# Patient Record
Sex: FEMALE | Race: WHITE | URBAN - METROPOLITAN AREA
[De-identification: names, ages, dates, MRNs, and addresses within clinical notes are randomized per-mention and may not be internally consistent; named-entity substitution may affect disease eponyms.]

---

## 2017-02-15 ENCOUNTER — OFFICE VISIT (OUTPATIENT)
Dept: OPHTHALMOLOGY | Facility: CLINIC | Age: 67
End: 2017-02-15

## 2017-02-15 DIAGNOSIS — H25.13 AGE-RELATED NUCLEAR CATARACT OF BOTH EYES: Primary | ICD-10-CM

## 2017-02-15 DIAGNOSIS — H43.392 FLOATER, VITREOUS, LEFT: ICD-10-CM

## 2017-02-15 PROCEDURE — 92014 COMPRE OPH EXAM EST PT 1/>: CPT | Performed by: OPHTHALMOLOGY

## 2017-02-15 NOTE — PATIENT INSTRUCTIONS
Age-related nuclear cataract of both eyes  Discussed early cataracts with patient. No treatment recommended at this time. Floater, vitreous  No treatment.

## 2017-02-15 NOTE — PROGRESS NOTES
Usha Maldonado is a 78year old female. HPI:     HPI     Patient is here for a complete eye exam. Patient states no ocular complaints or changes in her vision.        Last edited by Brennen Dimas O.T. on 2/15/2017  8:57 AM.     Patient History:  Past Me Cornea crocodile shagreen crocodile shagreen    Anterior Chamber Deep and quiet Deep and quiet    Iris Normal Normal    Lens 1+ Nuclear sclerosis, Trace Cortical cataract 1+ Nuclear sclerosis, Trace Cortical cataract    Vitreous Clear Vitreous floaters

## 2018-04-11 ENCOUNTER — OFFICE VISIT (OUTPATIENT)
Dept: OPHTHALMOLOGY | Facility: CLINIC | Age: 68
End: 2018-04-11

## 2018-04-11 DIAGNOSIS — H43.393 VITREOUS FLOATERS OF BOTH EYES: ICD-10-CM

## 2018-04-11 DIAGNOSIS — H25.13 AGE-RELATED NUCLEAR CATARACT OF BOTH EYES: Primary | ICD-10-CM

## 2018-04-11 PROCEDURE — 92014 COMPRE OPH EXAM EST PT 1/>: CPT | Performed by: OPHTHALMOLOGY

## 2018-04-11 RX ORDER — CHOLECALCIFEROL (VITAMIN D3) 125 MCG
2000 CAPSULE ORAL
COMMUNITY

## 2018-04-11 RX ORDER — MULTIVITAMIN
1 TABLET ORAL
COMMUNITY

## 2018-04-11 RX ORDER — ATORVASTATIN CALCIUM 10 MG/1
10 TABLET, FILM COATED ORAL
COMMUNITY
Start: 2017-10-27

## 2018-04-11 NOTE — PROGRESS NOTES
Julianna Pozo is a 75year old female. HPI:     HPI     Patient is here for a complete eye exam. Patient states no ocular complaints or changes in her vision.     Last edited by Ofe Gonsalez OT on 4/11/2018  2:22 PM. (History)        Patient History: Dilation     Both eyes:  paremyd @ 2:30 PM            Slit Lamp and Fundus Exam     Slit Lamp Exam       Right Left    Lids/Lashes Dermatochalasis, Meibomian gland dysfunction Dermatochalasis, Meibomian gland dysfunction    Conjunctiva/Sclera Temp pi

## 2019-07-11 ENCOUNTER — OFFICE VISIT (OUTPATIENT)
Dept: OPHTHALMOLOGY | Facility: CLINIC | Age: 69
End: 2019-07-11
Payer: COMMERCIAL

## 2019-07-11 DIAGNOSIS — H25.13 AGE-RELATED NUCLEAR CATARACT OF BOTH EYES: Primary | ICD-10-CM

## 2019-07-11 DIAGNOSIS — H43.393 VITREOUS FLOATERS OF BOTH EYES: ICD-10-CM

## 2019-07-11 PROCEDURE — 92014 COMPRE OPH EXAM EST PT 1/>: CPT | Performed by: OPHTHALMOLOGY

## 2019-07-11 NOTE — PROGRESS NOTES
Belen Verma is a 70year old female. HPI:     HPI     Pt is here for a complete exam. Pt is happy with current glasses, vision is stable. Pt has no ocular complaints at this time.      Last edited by Niharika Latif OT on 7/11/2019  2:56 PM. (History) Ortho Full, Ortho          Dilation     Both eyes:  Paremyd X2 @ 3:07 PM            Slit Lamp and Fundus Exam     Slit Lamp Exam       Right Left    Lids/Lashes Dermatochalasis, Meibomian gland dysfunction Dermatochalasis, Meibomian gland dysfunction    Co

## 2021-05-05 ENCOUNTER — OFFICE VISIT (OUTPATIENT)
Dept: OPHTHALMOLOGY | Facility: CLINIC | Age: 71
End: 2021-05-05
Payer: COMMERCIAL

## 2021-05-05 DIAGNOSIS — H43.393 VITREOUS FLOATERS OF BOTH EYES: ICD-10-CM

## 2021-05-05 DIAGNOSIS — H25.13 AGE-RELATED NUCLEAR CATARACT OF BOTH EYES: Primary | ICD-10-CM

## 2021-05-05 PROCEDURE — 92014 COMPRE OPH EXAM EST PT 1/>: CPT | Performed by: OPHTHALMOLOGY

## 2021-05-05 PROCEDURE — 92015 DETERMINE REFRACTIVE STATE: CPT | Performed by: OPHTHALMOLOGY

## 2021-05-05 NOTE — PATIENT INSTRUCTIONS
Age-related nuclear cataract of both eyes  Discussed moderate cataracts in both eyes that are not affecting vision and are not surgical at this time. New glasses today; update as needed. Discussed that new prescription is only a slight change.        Vi

## 2021-05-05 NOTE — PROGRESS NOTES
Lidia Grace is a 69year old female. HPI:     HPI     Pt in today for a complete eye exam. Pt states vision is stable with current glasses but wants an updated glasses Rx. Pt denies any ocular issues.      Last edited by Farhad Carranza OT on 5/5/2021 Dilation     Both eyes: 1.0% Mydriacyl and 2.5% Agustin Synephrine @ 9:50 AM            Slit Lamp and Fundus Exam     Slit Lamp Exam       Right Left    Lids/Lashes Dermatochalasis, Meibomian gland dysfunction, 2+ Scurf Dermatochalasis, Meibomian gland dysfunc

## 2022-05-09 ENCOUNTER — HOSPITAL ENCOUNTER (OUTPATIENT)
Age: 72
Discharge: HOME | End: 2022-05-09
Payer: MEDICARE

## 2022-05-09 VITALS
SYSTOLIC BLOOD PRESSURE: 130 MMHG | HEART RATE: 72 BPM | BODY MASS INDEX: 18.83 KG/M2 | DIASTOLIC BLOOD PRESSURE: 60 MMHG | TEMPERATURE: 98 F | OXYGEN SATURATION: 98 % | HEIGHT: 67 IN | RESPIRATION RATE: 16 BRPM | WEIGHT: 120 LBS

## 2022-05-09 DIAGNOSIS — U07.1 COVID-19: Primary | ICD-10-CM

## 2022-05-09 LAB — SARS-COV-2 RNA RESP QL NAA+PROBE: DETECTED

## 2022-05-09 PROCEDURE — U0002 COVID-19 LAB TEST NON-CDC: HCPCS | Performed by: NURSE PRACTITIONER

## 2022-05-09 PROCEDURE — 99204 OFFICE O/P NEW MOD 45 MIN: CPT | Performed by: NURSE PRACTITIONER

## 2022-05-09 PROCEDURE — M0222 INTRAVENOUS INJECTION, BEBTELOVIMAB, INCLUDES INJECTION AND POST ADMINISTRATIVE MONITORING: HCPCS | Performed by: NURSE PRACTITIONER

## 2022-05-09 RX ORDER — BEBTELOVIMAB 87.5 MG/ML
175 INJECTION, SOLUTION INTRAVENOUS ONCE
Status: COMPLETED | OUTPATIENT
Start: 2022-05-09 | End: 2022-05-09

## 2022-10-19 ENCOUNTER — OFFICE VISIT (OUTPATIENT)
Dept: OPHTHALMOLOGY | Facility: CLINIC | Age: 72
End: 2022-10-19
Payer: COMMERCIAL

## 2022-10-19 DIAGNOSIS — H43.393 VITREOUS FLOATERS OF BOTH EYES: ICD-10-CM

## 2022-10-19 DIAGNOSIS — H25.13 AGE-RELATED NUCLEAR CATARACT OF BOTH EYES: Primary | ICD-10-CM

## 2022-10-19 PROCEDURE — 1126F AMNT PAIN NOTED NONE PRSNT: CPT | Performed by: OPHTHALMOLOGY

## 2022-10-19 PROCEDURE — 92015 DETERMINE REFRACTIVE STATE: CPT | Performed by: OPHTHALMOLOGY

## 2022-10-19 PROCEDURE — 92014 COMPRE OPH EXAM EST PT 1/>: CPT | Performed by: OPHTHALMOLOGY

## 2022-10-19 NOTE — PATIENT INSTRUCTIONS
Age-related nuclear cataract of both eyes  Discussed moderate cataracts in both eyes that are not affecting vision and are not surgical at this time. New glasses today; update as needed. Discussed that new prescription is only a slight change. Will see patient in 1 year for a complete exam    Vitreous floaters of both eyes   There is no evidence of retinal pathology. All signs and symptoms of retinal detachment/tears explained in detail. Patient instructed to call the office if they experience increase in floaters, increase in flashes of light, loss of vision or curtain or veil effect.

## 2022-10-19 NOTE — ASSESSMENT & PLAN NOTE
Discussed moderate cataracts in both eyes that are not affecting vision and are not surgical at this time. New glasses today; update as needed. Discussed that new prescription is only a slight change.      Will see patient in 1 year for a complete exam

## 2023-07-03 ENCOUNTER — APPOINTMENT (OUTPATIENT)
Dept: GENERAL RADIOLOGY | Facility: HOSPITAL | Age: 73
End: 2023-07-03
Attending: EMERGENCY MEDICINE
Payer: MEDICARE

## 2023-07-03 ENCOUNTER — HOSPITAL ENCOUNTER (EMERGENCY)
Facility: HOSPITAL | Age: 73
Discharge: HOME | End: 2023-07-03
Attending: EMERGENCY MEDICINE
Payer: MEDICARE

## 2023-07-03 ENCOUNTER — APPOINTMENT (OUTPATIENT)
Dept: CT IMAGING | Facility: HOSPITAL | Age: 73
End: 2023-07-03
Attending: EMERGENCY MEDICINE
Payer: MEDICARE

## 2023-07-03 ENCOUNTER — HOSPITAL ENCOUNTER (OUTPATIENT)
Age: 73
Discharge: ANOTHER HOSPITAL | End: 2023-07-03
Payer: MEDICARE

## 2023-07-03 VITALS
DIASTOLIC BLOOD PRESSURE: 80 MMHG | SYSTOLIC BLOOD PRESSURE: 155 MMHG | WEIGHT: 130 LBS | HEART RATE: 72 BPM | OXYGEN SATURATION: 95 % | RESPIRATION RATE: 15 BRPM | HEIGHT: 63 IN | BODY MASS INDEX: 23.04 KG/M2 | TEMPERATURE: 98 F

## 2023-07-03 VITALS
TEMPERATURE: 97 F | HEART RATE: 95 BPM | RESPIRATION RATE: 18 BRPM | OXYGEN SATURATION: 97 % | SYSTOLIC BLOOD PRESSURE: 173 MMHG | DIASTOLIC BLOOD PRESSURE: 83 MMHG

## 2023-07-03 DIAGNOSIS — R42 DIZZINESS: Primary | ICD-10-CM

## 2023-07-03 LAB
ALBUMIN SERPL-MCNC: 3.9 G/DL (ref 3.4–5)
ALP LIVER SERPL-CCNC: 50 U/L
ALT SERPL-CCNC: 25 U/L
ANION GAP SERPL CALC-SCNC: 6 MMOL/L (ref 0–18)
AST SERPL-CCNC: 23 U/L (ref 15–37)
BASOPHILS # BLD AUTO: 0.03 X10(3) UL (ref 0–0.2)
BASOPHILS NFR BLD AUTO: 0.6 %
BILIRUB DIRECT SERPL-MCNC: <0.1 MG/DL (ref 0–0.2)
BILIRUB SERPL-MCNC: 0.4 MG/DL (ref 0.1–2)
BILIRUB UR QL: NEGATIVE
BUN BLD-MCNC: 21 MG/DL (ref 7–18)
BUN/CREAT SERPL: 26.6 (ref 10–20)
CALCIUM BLD-MCNC: 9.3 MG/DL (ref 8.5–10.1)
CHLORIDE SERPL-SCNC: 108 MMOL/L (ref 98–112)
CLARITY UR: CLEAR
CO2 SERPL-SCNC: 27 MMOL/L (ref 21–32)
COLOR UR: COLORLESS
CREAT BLD-MCNC: 0.79 MG/DL
DEPRECATED RDW RBC AUTO: 40.6 FL (ref 35.1–46.3)
EOSINOPHIL # BLD AUTO: 0.06 X10(3) UL (ref 0–0.7)
EOSINOPHIL NFR BLD AUTO: 1.1 %
ERYTHROCYTE [DISTWIDTH] IN BLOOD BY AUTOMATED COUNT: 12.1 % (ref 11–15)
GFR SERPLBLD BASED ON 1.73 SQ M-ARVRAT: 79 ML/MIN/1.73M2 (ref 60–?)
GLUCOSE BLD-MCNC: 106 MG/DL (ref 70–99)
GLUCOSE UR-MCNC: NORMAL MG/DL
HCT VFR BLD AUTO: 38.4 %
HGB BLD-MCNC: 13.1 G/DL
IMM GRANULOCYTES # BLD AUTO: 0 X10(3) UL (ref 0–1)
IMM GRANULOCYTES NFR BLD: 0 %
KETONES UR-MCNC: NEGATIVE MG/DL
LEUKOCYTE ESTERASE UR QL STRIP.AUTO: NEGATIVE
LIPASE SERPL-CCNC: 41 U/L (ref 13–75)
LYMPHOCYTES # BLD AUTO: 1.34 X10(3) UL (ref 1–4)
LYMPHOCYTES NFR BLD AUTO: 25.5 %
MCH RBC QN AUTO: 31.5 PG (ref 26–34)
MCHC RBC AUTO-ENTMCNC: 34.1 G/DL (ref 31–37)
MCV RBC AUTO: 92.3 FL
MONOCYTES # BLD AUTO: 0.34 X10(3) UL (ref 0.1–1)
MONOCYTES NFR BLD AUTO: 6.5 %
NEUTROPHILS # BLD AUTO: 3.49 X10 (3) UL (ref 1.5–7.7)
NEUTROPHILS # BLD AUTO: 3.49 X10(3) UL (ref 1.5–7.7)
NEUTROPHILS NFR BLD AUTO: 66.3 %
NITRITE UR QL STRIP.AUTO: NEGATIVE
OSMOLALITY SERPL CALC.SUM OF ELEC: 295 MOSM/KG (ref 275–295)
PH UR: 6.5 [PH] (ref 5–8)
PLATELET # BLD AUTO: 239 10(3)UL (ref 150–450)
POTASSIUM SERPL-SCNC: 4 MMOL/L (ref 3.5–5.1)
PROT SERPL-MCNC: 7.5 G/DL (ref 6.4–8.2)
PROT UR-MCNC: NEGATIVE MG/DL
RBC # BLD AUTO: 4.16 X10(6)UL
SODIUM SERPL-SCNC: 141 MMOL/L (ref 136–145)
SP GR UR STRIP: <1.005 (ref 1–1.03)
TROPONIN I HIGH SENSITIVITY: 11 NG/L
TROPONIN I HIGH SENSITIVITY: 9 NG/L
UROBILINOGEN UR STRIP-ACNC: NORMAL
WBC # BLD AUTO: 5.3 X10(3) UL (ref 4–11)

## 2023-07-03 PROCEDURE — 81001 URINALYSIS AUTO W/SCOPE: CPT | Performed by: EMERGENCY MEDICINE

## 2023-07-03 PROCEDURE — 99285 EMERGENCY DEPT VISIT HI MDM: CPT

## 2023-07-03 PROCEDURE — 93005 ELECTROCARDIOGRAM TRACING: CPT

## 2023-07-03 PROCEDURE — 99214 OFFICE O/P EST MOD 30 MIN: CPT | Performed by: NURSE PRACTITIONER

## 2023-07-03 PROCEDURE — 84484 ASSAY OF TROPONIN QUANT: CPT | Performed by: EMERGENCY MEDICINE

## 2023-07-03 PROCEDURE — 70498 CT ANGIOGRAPHY NECK: CPT | Performed by: EMERGENCY MEDICINE

## 2023-07-03 PROCEDURE — 36415 COLL VENOUS BLD VENIPUNCTURE: CPT

## 2023-07-03 PROCEDURE — 85025 COMPLETE CBC W/AUTO DIFF WBC: CPT | Performed by: EMERGENCY MEDICINE

## 2023-07-03 PROCEDURE — 71045 X-RAY EXAM CHEST 1 VIEW: CPT | Performed by: EMERGENCY MEDICINE

## 2023-07-03 PROCEDURE — 83690 ASSAY OF LIPASE: CPT | Performed by: EMERGENCY MEDICINE

## 2023-07-03 PROCEDURE — 70496 CT ANGIOGRAPHY HEAD: CPT | Performed by: EMERGENCY MEDICINE

## 2023-07-03 PROCEDURE — 93010 ELECTROCARDIOGRAM REPORT: CPT

## 2023-07-03 PROCEDURE — 80048 BASIC METABOLIC PNL TOTAL CA: CPT | Performed by: EMERGENCY MEDICINE

## 2023-07-03 PROCEDURE — 80076 HEPATIC FUNCTION PANEL: CPT | Performed by: EMERGENCY MEDICINE

## 2023-07-03 RX ORDER — MECLIZINE HYDROCHLORIDE 25 MG/1
12.5 TABLET ORAL 3 TIMES DAILY PRN
Qty: 10 TABLET | Refills: 0 | Status: SHIPPED | OUTPATIENT
Start: 2023-07-03 | End: 2023-07-06

## 2023-07-03 RX ORDER — MECLIZINE HYDROCHLORIDE 25 MG/1
25 TABLET ORAL ONCE
Status: COMPLETED | OUTPATIENT
Start: 2023-07-03 | End: 2023-07-03

## 2023-07-03 NOTE — ED INITIAL ASSESSMENT (HPI)
Pt c/o dizziness x 1 day. States is not much different with position changes. Denies headache, chest pain.

## 2023-07-03 NOTE — ED INITIAL ASSESSMENT (HPI)
Pt reports dizziness x1 day. Pt reports dizziness is worse when bends over.  Pt denies vision changes/ cp/ sob/ numbness/tingling to arms/ legs

## 2023-07-05 LAB
ATRIAL RATE: 63 BPM
ATRIAL RATE: 90 BPM
P AXIS: 50 DEGREES
P AXIS: 76 DEGREES
P-R INTERVAL: 160 MS
P-R INTERVAL: 168 MS
Q-T INTERVAL: 430 MS
Q-T INTERVAL: 490 MS
QRS DURATION: 140 MS
QRS DURATION: 142 MS
QTC CALCULATION (BEZET): 501 MS
QTC CALCULATION (BEZET): 526 MS
R AXIS: -18 DEGREES
R AXIS: -28 DEGREES
T AXIS: 64 DEGREES
T AXIS: 91 DEGREES
VENTRICULAR RATE: 63 BPM
VENTRICULAR RATE: 90 BPM

## 2023-07-06 ENCOUNTER — OFFICE VISIT (OUTPATIENT)
Dept: FAMILY MEDICINE CLINIC | Facility: CLINIC | Age: 73
End: 2023-07-06

## 2023-07-06 VITALS
HEART RATE: 67 BPM | OXYGEN SATURATION: 95 % | DIASTOLIC BLOOD PRESSURE: 72 MMHG | HEIGHT: 63 IN | TEMPERATURE: 98 F | BODY MASS INDEX: 22.54 KG/M2 | SYSTOLIC BLOOD PRESSURE: 120 MMHG | WEIGHT: 127.19 LBS

## 2023-07-06 DIAGNOSIS — R42 VERTIGO: Primary | ICD-10-CM

## 2023-07-06 DIAGNOSIS — R09.89 LABILE BLOOD PRESSURE: ICD-10-CM

## 2023-07-06 PROCEDURE — 3078F DIAST BP <80 MM HG: CPT | Performed by: FAMILY MEDICINE

## 2023-07-06 PROCEDURE — 1111F DSCHRG MED/CURRENT MED MERGE: CPT | Performed by: FAMILY MEDICINE

## 2023-07-06 PROCEDURE — 3008F BODY MASS INDEX DOCD: CPT | Performed by: FAMILY MEDICINE

## 2023-07-06 PROCEDURE — 3074F SYST BP LT 130 MM HG: CPT | Performed by: FAMILY MEDICINE

## 2023-07-06 PROCEDURE — 99203 OFFICE O/P NEW LOW 30 MIN: CPT | Performed by: FAMILY MEDICINE

## 2023-07-06 RX ORDER — MECLIZINE HYDROCHLORIDE 25 MG/1
12.5 TABLET ORAL 3 TIMES DAILY PRN
Qty: 30 TABLET | Refills: 0 | Status: SHIPPED | OUTPATIENT
Start: 2023-07-06 | End: 2023-08-05

## 2023-07-06 NOTE — PROGRESS NOTES
Subjective:   Patient ID: Amee Akers is a 67year old female. HPI  Patient presents with:  Hospital F/U: Pt presents for hospital follow up at [address] [place] [address] ER on 7/3/2023 for dizziness with high BP,   Feels well now  History/Other:   Review of Systems   Constitutional: Negative. Respiratory: Negative. Cardiovascular: Negative. Neurological:  Positive for dizziness. Negative for weakness and numbness. Current Outpatient Medications   Medication Sig Dispense Refill    meclizine 25 MG Oral Tab Take 0.5 tablets (12.5 mg total) by mouth 3 (three) times daily as needed. 30 tablet 0    atorvastatin 10 MG Oral Tab Take 1 tablet (10 mg total) by mouth. Cholecalciferol (VITAMIN D3) 2000 units Oral Tab Take 1 tablet (2,000 Units total) by mouth. Multiple Vitamin Oral Tab Take 1 tablet by mouth. Alendronate Sodium (FOSAMAX) 70 MG Oral Tab   1     Allergies:No Known Allergies    Objective:   Physical Exam  Vitals reviewed. Constitutional:       Appearance: Normal appearance. Cardiovascular:      Rate and Rhythm: Normal rate and regular rhythm. Pulses:           Carotid pulses are 2+ on the right side and 2+ on the left side. Heart sounds: Normal heart sounds. Pulmonary:      Effort: Pulmonary effort is normal.      Breath sounds: Normal breath sounds. Neurological:      Mental Status: She is alert. Assessment & Plan:   Vertigo  (primary encounter diagnosis)  Labile blood pressure    Reviewed ED notes and labs and CTA  Normal today. Recommend taking her BP daily and return in one month with recordings. May use meclizine as needed . So far has worked well for her. No orders of the defined types were placed in this encounter. Meds This Visit:  Requested Prescriptions     Signed Prescriptions Disp Refills    meclizine 25 MG Oral Tab 30 tablet 0     Sig: Take 0.5 tablets (12.5 mg total) by mouth 3 (three) times daily as needed.        Imaging & Referrals:  None

## 2023-08-08 ENCOUNTER — OFFICE VISIT (OUTPATIENT)
Dept: FAMILY MEDICINE CLINIC | Facility: CLINIC | Age: 73
End: 2023-08-08

## 2023-08-08 VITALS
BODY MASS INDEX: 22.5 KG/M2 | WEIGHT: 127 LBS | OXYGEN SATURATION: 96 % | HEART RATE: 62 BPM | DIASTOLIC BLOOD PRESSURE: 70 MMHG | HEIGHT: 63 IN | SYSTOLIC BLOOD PRESSURE: 119 MMHG | TEMPERATURE: 98 F

## 2023-08-08 DIAGNOSIS — R09.89 LABILE BLOOD PRESSURE: Primary | ICD-10-CM

## 2023-08-08 DIAGNOSIS — R42 VERTIGO: ICD-10-CM

## 2023-08-08 PROCEDURE — 3074F SYST BP LT 130 MM HG: CPT | Performed by: FAMILY MEDICINE

## 2023-08-08 PROCEDURE — 99214 OFFICE O/P EST MOD 30 MIN: CPT | Performed by: FAMILY MEDICINE

## 2023-08-08 PROCEDURE — 3078F DIAST BP <80 MM HG: CPT | Performed by: FAMILY MEDICINE

## 2023-08-08 PROCEDURE — 1159F MED LIST DOCD IN RCRD: CPT | Performed by: FAMILY MEDICINE

## 2023-08-08 PROCEDURE — 3008F BODY MASS INDEX DOCD: CPT | Performed by: FAMILY MEDICINE

## 2023-10-24 ENCOUNTER — OFFICE VISIT (OUTPATIENT)
Dept: OPHTHALMOLOGY | Facility: CLINIC | Age: 73
End: 2023-10-24

## 2023-10-24 DIAGNOSIS — H43.393 VITREOUS FLOATERS OF BOTH EYES: ICD-10-CM

## 2023-10-24 DIAGNOSIS — H25.13 AGE-RELATED NUCLEAR CATARACT OF BOTH EYES: Primary | ICD-10-CM

## 2023-10-24 PROCEDURE — 1160F RVW MEDS BY RX/DR IN RCRD: CPT | Performed by: OPHTHALMOLOGY

## 2023-10-24 PROCEDURE — 1159F MED LIST DOCD IN RCRD: CPT | Performed by: OPHTHALMOLOGY

## 2023-10-24 PROCEDURE — 92014 COMPRE OPH EXAM EST PT 1/>: CPT | Performed by: OPHTHALMOLOGY

## 2023-10-24 PROCEDURE — 92015 DETERMINE REFRACTIVE STATE: CPT | Performed by: OPHTHALMOLOGY

## 2023-10-24 NOTE — PROGRESS NOTES
Genevieve Hobson is a 67year old female. HPI:     HPI    Pt is here for yearly eye exam.  Pt denies vision changes. Last edited by Jennifer Cabrera O.T. on 10/24/2023  9:54 AM.        Patient History:  Past Medical History:   Diagnosis Date    Age-related nuclear cataract of both eyes 8/6/2014    Cataracts, bilateral 1999    Floater, vitreous 11/11/2015    Itchy eyes 11/11/2015    MGD (meibomian gland disease) 2006    OU    Pinguecula 2004    OS       Surgical History: Genevieve Hobson has no past surgical history on file. Family History   Problem Relation Age of Onset    Dementia Mother     Diabetes Neg         multiple    Glaucoma Neg         multiple    Macular degeneration Neg         multiple    Retinal detachment Neg        Social History:   Social History     Socioeconomic History    Marital status:    Tobacco Use    Smoking status: Never     Passive exposure: Never    Smokeless tobacco: Never   Vaping Use    Vaping Use: Never used   Substance and Sexual Activity    Alcohol use: No    Drug use: No       Medications:  Current Outpatient Medications   Medication Sig Dispense Refill    atorvastatin 10 MG Oral Tab Take 1 tablet (10 mg total) by mouth. Cholecalciferol (VITAMIN D3) 2000 units Oral Tab Take 1 tablet (2,000 Units total) by mouth. Multiple Vitamin Oral Tab Take 1 tablet by mouth. Alendronate Sodium (FOSAMAX) 70 MG Oral Tab   1       Allergies:  No Known Allergies    ROS:       PHYSICAL EXAM:     Base Eye Exam       Visual Acuity (Snellen - Linear)         Right Left    Dist cc 20/30 -2 20/20 -1    Near cc 20/30 20/25      Correction: Glasses              Tonometry ([facility], 10:10 AM)         Right Left    Pressure 16 17              Pupils         Pupils    Right PERRL    Left PERRL              Visual Fields         Left Right     Full Full              Extraocular Movement         Right Left     Full, Ortho Full, Ortho              Neuro/Psych       Oriented x3:  Yes Mood/Affect: Normal              Dilation       Both eyes: 1.0% Mydriacyl and 2.5% Agustin Synephrine @ 10:08 AM                  Slit Lamp and Fundus Exam       Slit Lamp Exam         Right Left    Lids/Lashes Dermatochalasis, Meibomian gland dysfunction, 2+ Scurf Dermatochalasis, Meibomian gland dysfunction, 2+ Scurf    Conjunctiva/Sclera Temp pinguecula Nasal pinguecula    Cornea crocodile shagreen crocodile shagreen    Anterior Chamber Deep and quiet Deep and quiet    Iris Normal Normal    Lens 1-2+ Nuclear sclerosis, Trace Cortical cataract nasally  1-2+ Nuclear sclerosis, 1+Cortical cataract nasally     Vitreous Vitreous floaters Vitreous floaters              Fundus Exam         Right Left    Disc Good rim Good rim    C/D Ratio 0.15 0.15    Macula Normal Normal    Vessels Normal Normal    Periphery Normal Normal                  Refraction       Wearing Rx         Sphere Cylinder Axis Add    Right +1.75 +0.75 165 +2.75    Left +1.50 +0.50 010 +2.75      Type: Flat top bifocal              Manifest Refraction         Sphere Cylinder [place] Dist VA Add Near VA    Right +1.75 +1.00 165 20/25- +2.75 20/20    Left +1.50 +0.50 010 20/20- +2.75 20/20              Final Rx         Sphere Cylinder [place] Dist VA Add Near VA    Right +1.75 +1.00 165 20/25- +2.75 20/20    Left +1.50 +0.50 010 20/20- +2.75 20/20      Type: Flat top bifocal                     ASSESSMENT/PLAN:     Diagnoses and Plan:     Age-related nuclear cataract of both eyes  Discussed moderate cataracts in both eyes that are not affecting vision and are not surgical at this time. New glasses today; update as needed. Discussed that new prescription is only a slight change. Will see patient in 1 year for a complete exam    Vitreous floaters of both eyes   There is no evidence of retinal pathology. All signs and symptoms of retinal detachment/tears explained in detail.     Patient instructed to call the office if they experience increase in floaters, increase in flashes of light, loss of vision or curtain or veil effect. No orders of the defined types were placed in this encounter.       Meds This Visit:  Requested Prescriptions      No prescriptions requested or ordered in this encounter        Follow up instructions:  Return in about 1 year (around 10/24/2024) for complete exam.    10/24/2023  Scribed by: Vince Bolanos MD

## 2024-01-09 ENCOUNTER — OFFICE VISIT (OUTPATIENT)
Dept: FAMILY MEDICINE CLINIC | Facility: CLINIC | Age: 74
End: 2024-01-09

## 2024-01-09 VITALS
SYSTOLIC BLOOD PRESSURE: 147 MMHG | DIASTOLIC BLOOD PRESSURE: 73 MMHG | WEIGHT: 129 LBS | BODY MASS INDEX: 20.73 KG/M2 | HEIGHT: 66 IN | HEART RATE: 66 BPM | OXYGEN SATURATION: 96 %

## 2024-01-09 DIAGNOSIS — M81.0 AGE-RELATED OSTEOPOROSIS WITHOUT CURRENT PATHOLOGICAL FRACTURE: ICD-10-CM

## 2024-01-09 DIAGNOSIS — E78.00 HYPERCHOLESTEROLEMIA: Primary | ICD-10-CM

## 2024-01-09 DIAGNOSIS — Z00.00 ENCOUNTER FOR ANNUAL HEALTH EXAMINATION: ICD-10-CM

## 2024-01-09 DIAGNOSIS — H25.13 AGE-RELATED NUCLEAR CATARACT OF BOTH EYES: ICD-10-CM

## 2024-01-09 DIAGNOSIS — E55.9 VITAMIN D DEFICIENCY DISEASE: ICD-10-CM

## 2024-01-09 DIAGNOSIS — Z12.31 ENCOUNTER FOR SCREENING MAMMOGRAM FOR MALIGNANT NEOPLASM OF BREAST: ICD-10-CM

## 2024-01-09 DIAGNOSIS — H43.393 VITREOUS FLOATERS OF BOTH EYES: ICD-10-CM

## 2024-01-09 PROBLEM — E46 PROTEIN-CALORIE MALNUTRITION, UNSPECIFIED SEVERITY (HCC): Status: RESOLVED | Noted: 2024-01-09 | Resolved: 2024-01-09

## 2024-01-09 PROBLEM — E46 PROTEIN-CALORIE MALNUTRITION, UNSPECIFIED SEVERITY (HCC): Status: ACTIVE | Noted: 2024-01-09

## 2024-01-09 PROCEDURE — 1170F FXNL STATUS ASSESSED: CPT | Performed by: FAMILY MEDICINE

## 2024-01-09 PROCEDURE — G0439 PPPS, SUBSEQ VISIT: HCPCS | Performed by: FAMILY MEDICINE

## 2024-01-09 PROCEDURE — 1159F MED LIST DOCD IN RCRD: CPT | Performed by: FAMILY MEDICINE

## 2024-01-09 PROCEDURE — 96160 PT-FOCUSED HLTH RISK ASSMT: CPT | Performed by: FAMILY MEDICINE

## 2024-01-09 PROCEDURE — 3008F BODY MASS INDEX DOCD: CPT | Performed by: FAMILY MEDICINE

## 2024-01-09 PROCEDURE — 3077F SYST BP >= 140 MM HG: CPT | Performed by: FAMILY MEDICINE

## 2024-01-09 PROCEDURE — 3078F DIAST BP <80 MM HG: CPT | Performed by: FAMILY MEDICINE

## 2024-01-09 PROCEDURE — 1126F AMNT PAIN NOTED NONE PRSNT: CPT | Performed by: FAMILY MEDICINE

## 2024-01-09 RX ORDER — ALENDRONATE SODIUM 70 MG/1
70 TABLET ORAL WEEKLY
Qty: 12 TABLET | Refills: 3 | Status: SHIPPED | OUTPATIENT
Start: 2024-01-09

## 2024-01-09 RX ORDER — ATORVASTATIN CALCIUM 10 MG/1
10 TABLET, FILM COATED ORAL DAILY
Qty: 90 TABLET | Refills: 3 | Status: SHIPPED | OUTPATIENT
Start: 2024-01-09

## 2024-01-09 NOTE — PATIENT INSTRUCTIONS
[facility] SCREENING SCHEDULE   Tests on this list are recommended by your physician but may not be covered, or covered at this frequency, by your insurer.   Please check with your insurance carrier before scheduling to verify coverage.   PREVENTATIVE SERVICES FREQUENCY &  COVERAGE DETAILS LAST COMPLETION DATE   Diabetes Screening    Fasting Blood Sugar /  Glucose    One screening every 12 months if never tested or if previously tested but not diagnosed with pre-diabetes   One screening every 6 months if diagnosed with pre-diabetes Lab Results   Component Value Date     (H) 07/03/2023        Cardiovascular Disease Screening    Lipid Panel  Cholesterol  Lipoprotein (HDL)  Triglycerides Covered every 5 years for all Medicare beneficiaries without apparent signs or symptoms of cardiovascular disease No results found for: \"CHOLEST\", \"HDL\", \"LDL\", \"LDLD\", \"LDLC\", \"TRIG\"      Electrocardiogram (EKG)   Covered if needed at Welcome to Medicare, and non-screening if indicated for medical reasons 07/05/2023      Ultrasound Screening for Abdominal Aortic Aneurysm (AAA) Covered once in a lifetime for one of the following risk factors   • Men who are 65-75 years old and have ever smoked   • Anyone with a family history -     Colorectal Cancer Screening  Covered for ages 50-85; only need ONE of the following:    Colonoscopy   Covered every 10 years    Covered every 2 years if patient is at high risk or previous colonoscopy was abnormal 01/01/2017    Health Maintenance   Topic Date Due   • Colorectal Cancer Screening  01/01/2027       Flexible Sigmoidoscopy   Covered every 4 years -    Fecal Occult Blood Test Covered annually -   Bone Density Screening    Bone density screening    Covered every 2 years after age 65 if diagnosed with risk of osteoporosis or estrogen deficiency.    Covered yearly for long-term glucocorticoid medication use (Steroids) Last Dexa Scan:    XR DEXA BONE DENSITOMETRY (CPT=77080)  11/20/2015      No recommendations at this time   Pap and Pelvic    Pap   Covered every 2 years for women at normal risk; Annually if at high risk -  No recommendations at this time    Chlamydia Annually if high risk -  No recommendations at this time   Screening Mammogram    Mammogram     Recommend annually for all female patients aged 40 and older    One baseline mammogram covered for patients aged 35-39 -    Health Maintenance   Topic Date Due   • Mammogram  Never done       Immunizations    Influenza Covered once per flu season  Please get every year -  No recommendations at this time    Pneumococcal Each vaccine ([id number] & [id number]) covered once after 65 Prevnar 13: 10/25/2016    [id number]: 10/27/2017     No recommendations at this time    Hepatitis B One screening covered for patients with certain risk factors   -  No recommendations at this time    Tetanus Toxoid Not covered by Medicare Part B unless medically necessary (cut with metal); may be covered with your pharmacy prescription benefits -    Tetanus, Diptheria and Pertusis TD and TDaP Not covered by Medicare Part B -  No recommendations at this time    Zoster Not covered by Medicare Part B; may be covered with your pharmacy  prescription benefits -  Zoster Vaccines(1 of 2) Never done

## 2024-01-09 NOTE — PROGRESS NOTES
Subjective:   Beverly Angeles is a 73 year old female who presents for a Medicare Subsequent Annual Wellness visit (Pt already had Initial Annual Wellness) and scheduled follow up of multiple significant but stable problems.       History/Other:   Fall Risk Assessment:   She has been screened for Falls and is low risk.      Cognitive Assessment:   Abnormal  What day of the week is this?: Correct  What month is it?: Correct  What year is it?: Correct  Recall \"Ball\": Incorrect  Recall \"Flag\": Correct  Recall \"Tree\": Incorrect    Functional Ability/Status:   Beverly Angeles has some abnormal functions as listed below:  She has Driving difficulties based on screening of functional status. She has Vision problems based on screening of functional status.       Depression Screening (PHQ-2/PHQ-9): PHQ-2 SCORE: 0, done 1/9/2024             Advanced Directives:   She does have a Living Will but we do NOT have it on file in Epic.    She does NOT have a Power of  for Health Care. [Do you have a healthcare power of ?: No]  Discussed Advance Care Planning with patient (and family/surrogate if present). Standard forms made available to patient in After Visit Summary.      Patient Active Problem List   Diagnosis    Age-related nuclear cataract of both eyes    Vitreous floaters of both eyes    Hypercholesterolemia    Age-related osteoporosis without current pathological fracture    Vitamin D deficiency disease     Allergies:  She has No Known Allergies.    Current Medications:  Outpatient Medications Marked as Taking for the 1/9/24 encounter (Office Visit) with Jayden Batista, DO   Medication Sig    alendronate 70 MG Oral Tab Take 1 tablet (70 mg total) by mouth once a week.    atorvastatin 10 MG Oral Tab Take 1 tablet (10 mg total) by mouth daily.    Cholecalciferol (VITAMIN D3) 2000 units Oral Tab Take 1 tablet (2,000 Units total) by mouth.    Multiple Vitamin Oral Tab Take 1 tablet by mouth.       Medical  History:  She  has a past medical history of Age-related nuclear cataract of both eyes (8/6/2014), Cataracts, bilateral (1999), Floater, vitreous (11/11/2015), Itchy eyes (11/11/2015), MGD (meibomian gland disease) (2006), and Pinguecula (2004).  Surgical History:  She  has no past surgical history on file.   Family History:  Her family history includes Dementia in her mother.  Social History:  She  reports that she has never smoked. She has never been exposed to tobacco smoke. She has never used smokeless tobacco. She reports that she does not drink alcohol and does not use drugs.    Tobacco:  She has never smoked tobacco.    CAGE Alcohol Screen:   CAGE screening score of 0 on 1/9/2024, showing low risk of alcohol abuse.      Patient Care Team:  Per Schofield MD as PCP - General (Internal Medicine)    Review of Systems   Constitutional: Negative.    HENT: Negative.     Eyes: Negative.    Respiratory: Negative.     Cardiovascular: Negative.    Gastrointestinal: Negative.    Genitourinary: Negative.    Musculoskeletal: Negative.    Skin: Negative.    Neurological: Negative.    Psychiatric/Behavioral: Negative.            Objective:   Physical Exam  Vitals reviewed.   Constitutional:       Appearance: Normal appearance. She is well-developed.   HENT:      Head: Normocephalic.      Right Ear: Hearing, tympanic membrane and ear canal normal.      Left Ear: Hearing, tympanic membrane and ear canal normal.      Nose: Nose normal.   Eyes:      Conjunctiva/sclera: Conjunctivae normal.      Pupils: Pupils are equal, round, and reactive to light.      Funduscopic exam:     Right eye: No hemorrhage or AV nicking.         Left eye: No hemorrhage or AV nicking.   Neck:      Thyroid: No thyroid mass or thyromegaly.   Cardiovascular:      Heart sounds: Normal heart sounds.   Pulmonary:      Breath sounds: Normal breath sounds.   Abdominal:      Tenderness: There is no abdominal tenderness.   Musculoskeletal:      Cervical back:  Normal range of motion and neck supple.      Lumbar back: Normal.   Lymphadenopathy:      Upper Body:      Right upper body: No supraclavicular adenopathy.      Left upper body: No supraclavicular adenopathy.   Skin:     Comments: No suspicious findings waist up exam   Neurological:      Mental Status: She is alert and oriented to person, place, and time.      Sensory: No sensory deficit.      Deep Tendon Reflexes: Reflexes are normal and symmetric.   Psychiatric:         Mood and Affect: Mood is not anxious or depressed.            /73   Pulse 66   Ht 5' 6\" (1.676 m)   Wt 129 lb (58.5 kg)   SpO2 96%   BMI 20.82 kg/m²  Estimated body mass index is 20.82 kg/m² as calculated from the following:    Height as of this encounter: 5' 6\" (1.676 m).    Weight as of this encounter: 129 lb (58.5 kg).    Medicare Hearing Assessment:   Hearing Screening    Time taken: 1/9/2024  9:37 AM  Screening Method: Finger Rub  Finger Rub Result: Pass                     Assessment & Plan:   Beverly Angeles is a 73 year old female who presents for a Medicare Assessment.     1. Hypercholesterolemia (Primary)  -     CBC With Differential With Platelet; Future; Expected date: 01/09/2024  -     Comp Metabolic Panel (14); Future; Expected date: 01/09/2024  -     Lipid Panel; Future; Expected date: 01/09/2024  Laboratory testing ordered    2. Age-related osteoporosis without current pathological fracture  -     XR DEXA BONE DENSITOMETRY (CPT=77080); Future; Expected date: 01/09/2024  She has been taking medication for approximately 5 years we will check her bone density status    3. Age-related nuclear cataract of both eyes  Stable has been seen by ophthalmologist    4. Vitreous floaters of both eyes  Stable asymptomatic    5. Vitamin D deficiency disease  Overview:  Formatting of this note might be different from the original.   vit D = 23  Check levels is currently on supplement    Orders:  -     Vitamin D; Future; Expected date:  01/09/2024  6. Encounter for screening mammogram for malignant neoplasm of breast  -     [facility] 2D+3D SCREENING BILAT (CPT=77067/58553); Future; Expected date: 01/09/2024  7. Encounter for annual health examination  Other orders  -     Alendronate Sodium; Take 1 tablet (70 mg total) by mouth once a week.  Dispense: 12 tablet; Refill: 3  -     Atorvastatin Calcium; Take 1 tablet (10 mg total) by mouth daily.  Dispense: 90 tablet; Refill: 3    The patient indicates understanding of these issues and agrees to the plan.  Lab work ordered.  Reinforced healthy diet, lifestyle, and exercise.      Return in about 6 months (around 7/1/2024).     Jayden Batista, , 1/9/2024     Supplementary Documentation:   General Health:  In the past six months, have you lost more than 10 pounds without trying?: 2 - No  Has your appetite been poor?: No  How does the patient maintain a good energy level?: Appropriate Exercise  How would you describe your daily physical activity?: Moderate  How would you describe your current health state?: Good  How do you maintain positive mental well-being?: Social Interaction  On a scale of 0 to 10, with 0 being no pain and 10 being severe pain, what is your pain level?: 0 - (None)  In the past six months, have you experienced urine leakage?: 0-No  At any time do you feel concerned for the safety/well-being of yourself and/or your children, in your home or elsewhere?: Yes  Have you had any immunizations at another office such as Influenza, Hepatitis B, Tetanus, or Pneumococcal?: No       Beverly Angeles's SCREENING SCHEDULE   Tests on this list are recommended by your physician but may not be covered, or covered at this frequency, by your insurer.   Please check with your insurance carrier before scheduling to verify coverage.   PREVENTATIVE SERVICES FREQUENCY &  COVERAGE DETAILS LAST COMPLETION DATE   Diabetes Screening    Fasting Blood Sugar /  Glucose    One screening every 12 months if never tested  or if previously tested but not diagnosed with pre-diabetes   One screening every 6 months if diagnosed with pre-diabetes Lab Results   Component Value Date     (H) 07/03/2023        Cardiovascular Disease Screening    Lipid Panel  Cholesterol  Lipoprotein (HDL)  Triglycerides Covered every 5 years for all Medicare beneficiaries without apparent signs or symptoms of cardiovascular disease No results found for: \"CHOLEST\", \"HDL\", \"LDL\", \"LDLD\", \"LDLC\", \"TRIG\"      Electrocardiogram (EKG)   Covered if needed at Welcome to Medicare, and non-screening if indicated for medical reasons 07/05/2023      Ultrasound Screening for Abdominal Aortic Aneurysm (AAA) Covered once in a lifetime for one of the following risk factors    Men who are 65-75 years old and have ever smoked    Anyone with a family history -     Colorectal Cancer Screening  Covered for ages 50-85; only need ONE of the following:    Colonoscopy   Covered every 10 years    Covered every 2 years if patient is at high risk or previous colonoscopy was abnormal 01/01/2017    Health Maintenance   Topic Date Due    Colorectal Cancer Screening  01/01/2027       Flexible Sigmoidoscopy   Covered every 4 years -    Fecal Occult Blood Test Covered annually -   Bone Density Screening    Bone density screening    Covered every 2 years after age 65 if diagnosed with risk of osteoporosis or estrogen deficiency.    Covered yearly for long-term glucocorticoid medication use (Steroids) Last Dexa Scan:    XR DEXA BONE DENSITOMETRY (CPT=77080) 11/20/2015      No recommendations at this time   Pap and Pelvic    Pap   Covered every 2 years for women at normal risk; Annually if at high risk -  No recommendations at this time    Chlamydia Annually if high risk -  No recommendations at this time   Screening Mammogram    Mammogram     Recommend annually for all female patients aged 40 and older    One baseline mammogram covered for patients aged 35-39 -    [employer]    Topic Date Due    Mammogram  Never done       Immunizations    Influenza Covered once per flu season  Please get every year -  No recommendations at this time    Pneumococcal Each vaccine ([id number] & [id number]) covered once after 65 Prevnar 13: 10/25/2016    [id number]: 10/27/2017     No recommendations at this time    Hepatitis B One screening covered for patients with certain risk factors   -  No recommendations at this time    Tetanus Toxoid Not covered by Medicare Part B unless medically necessary (cut with metal); may be covered with your pharmacy prescription benefits -    Tetanus, Diptheria and Pertusis TD and TDaP Not covered by Medicare Part B -  No recommendations at this time    Zoster Not covered by Medicare Part B; may be covered with your pharmacy  prescription benefits -  Zoster Vaccines(1 of 2) Never done

## 2024-01-11 ENCOUNTER — LAB ENCOUNTER (OUTPATIENT)
Dept: LAB | Age: 74
End: 2024-01-11
Attending: FAMILY MEDICINE
Payer: MEDICARE

## 2024-01-11 DIAGNOSIS — E78.00 HYPERCHOLESTEROLEMIA: ICD-10-CM

## 2024-01-11 DIAGNOSIS — E55.9 VITAMIN D DEFICIENCY DISEASE: ICD-10-CM

## 2024-01-11 LAB
ALBUMIN SERPL-MCNC: 4.2 G/DL (ref 3.2–4.8)
ALBUMIN/GLOB SERPL: 1.6 {RATIO} (ref 1–2)
ALP LIVER SERPL-CCNC: 45 U/L
ALT SERPL-CCNC: 15 U/L
ANION GAP SERPL CALC-SCNC: 6 MMOL/L (ref 0–18)
AST SERPL-CCNC: 14 U/L (ref ?–34)
BASOPHILS # BLD AUTO: 0.03 X10(3) UL (ref 0–0.2)
BASOPHILS NFR BLD AUTO: 0.5 %
BILIRUB SERPL-MCNC: 0.6 MG/DL (ref 0.2–1.1)
BUN BLD-MCNC: 16 MG/DL (ref 9–23)
BUN/CREAT SERPL: 20.3 (ref 10–20)
CALCIUM BLD-MCNC: 9.2 MG/DL (ref 8.7–10.4)
CHLORIDE SERPL-SCNC: 105 MMOL/L (ref 98–112)
CHOLEST SERPL-MCNC: 177 MG/DL (ref ?–200)
CO2 SERPL-SCNC: 27 MMOL/L (ref 21–32)
CREAT BLD-MCNC: 0.79 MG/DL
DEPRECATED RDW RBC AUTO: 42.2 FL (ref 35.1–46.3)
EGFRCR SERPLBLD CKD-EPI 2021: 78 ML/MIN/1.73M2 (ref 60–?)
EOSINOPHIL # BLD AUTO: 0.04 X10(3) UL (ref 0–0.7)
EOSINOPHIL NFR BLD AUTO: 0.7 %
ERYTHROCYTE [DISTWIDTH] IN BLOOD BY AUTOMATED COUNT: 12.2 % (ref 11–15)
FASTING PATIENT LIPID ANSWER: YES
FASTING STATUS PATIENT QL REPORTED: YES
GLOBULIN PLAS-MCNC: 2.6 G/DL (ref 2.8–4.4)
GLUCOSE BLD-MCNC: 94 MG/DL (ref 70–99)
HCT VFR BLD AUTO: 38.1 %
HDLC SERPL-MCNC: 59 MG/DL (ref 40–59)
HGB BLD-MCNC: 12.8 G/DL
IMM GRANULOCYTES # BLD AUTO: 0.01 X10(3) UL (ref 0–1)
IMM GRANULOCYTES NFR BLD: 0.2 %
LDLC SERPL CALC-MCNC: 102 MG/DL (ref ?–100)
LYMPHOCYTES # BLD AUTO: 1.43 X10(3) UL (ref 1–4)
LYMPHOCYTES NFR BLD AUTO: 25.3 %
MCH RBC QN AUTO: 31.4 PG (ref 26–34)
MCHC RBC AUTO-ENTMCNC: 33.6 G/DL (ref 31–37)
MCV RBC AUTO: 93.6 FL
MONOCYTES # BLD AUTO: 0.39 X10(3) UL (ref 0.1–1)
MONOCYTES NFR BLD AUTO: 6.9 %
NEUTROPHILS # BLD AUTO: 3.75 X10 (3) UL (ref 1.5–7.7)
NEUTROPHILS # BLD AUTO: 3.75 X10(3) UL (ref 1.5–7.7)
NEUTROPHILS NFR BLD AUTO: 66.4 %
NONHDLC SERPL-MCNC: 118 MG/DL (ref ?–130)
OSMOLALITY SERPL CALC.SUM OF ELEC: 287 MOSM/KG (ref 275–295)
PLATELET # BLD AUTO: 254 10(3)UL (ref 150–450)
POTASSIUM SERPL-SCNC: 4 MMOL/L (ref 3.5–5.1)
PROT SERPL-MCNC: 6.8 G/DL (ref 5.7–8.2)
RBC # BLD AUTO: 4.07 X10(6)UL
SODIUM SERPL-SCNC: 138 MMOL/L (ref 136–145)
TRIGL SERPL-MCNC: 87 MG/DL (ref 30–149)
VIT D+METAB SERPL-MCNC: 42.8 NG/ML (ref 30–100)
VLDLC SERPL CALC-MCNC: 14 MG/DL (ref 0–30)
WBC # BLD AUTO: 5.7 X10(3) UL (ref 4–11)

## 2024-01-11 PROCEDURE — 82306 VITAMIN D 25 HYDROXY: CPT

## 2024-01-11 PROCEDURE — 85025 COMPLETE CBC W/AUTO DIFF WBC: CPT

## 2024-01-11 PROCEDURE — 80053 COMPREHEN METABOLIC PANEL: CPT

## 2024-01-11 PROCEDURE — 36415 COLL VENOUS BLD VENIPUNCTURE: CPT

## 2024-01-11 PROCEDURE — 80061 LIPID PANEL: CPT

## 2024-01-17 ENCOUNTER — HOSPITAL ENCOUNTER (OUTPATIENT)
Dept: BONE DENSITY | Facility: HOSPITAL | Age: 74
Discharge: HOME | End: 2024-01-17
Attending: FAMILY MEDICINE
Payer: MEDICARE

## 2024-01-17 DIAGNOSIS — M81.0 AGE-RELATED OSTEOPOROSIS WITHOUT CURRENT PATHOLOGICAL FRACTURE: ICD-10-CM

## 2024-01-17 PROCEDURE — 77080 DXA BONE DENSITY AXIAL: CPT | Performed by: FAMILY MEDICINE

## 2024-01-23 ENCOUNTER — TELEPHONE (OUTPATIENT)
Dept: FAMILY MEDICINE CLINIC | Facility: CLINIC | Age: 74
End: 2024-01-23

## 2024-01-23 NOTE — TELEPHONE ENCOUNTER
Patient's spouse, on [facility]IL was notified of normal test results.  Encounter created,unable to access result note.  No further action.

## 2024-03-12 ENCOUNTER — HOSPITAL ENCOUNTER (OUTPATIENT)
Dept: MAMMOGRAPHY | Facility: HOSPITAL | Age: 74
Discharge: HOME | End: 2024-03-12
Attending: FAMILY MEDICINE
Payer: MEDICARE

## 2024-03-12 DIAGNOSIS — Z12.31 ENCOUNTER FOR SCREENING MAMMOGRAM FOR MALIGNANT NEOPLASM OF BREAST: ICD-10-CM

## 2024-03-12 PROCEDURE — 77067 SCR MAMMO BI INCL CAD: CPT | Performed by: FAMILY MEDICINE

## 2024-03-12 PROCEDURE — 77063 BREAST TOMOSYNTHESIS BI: CPT | Performed by: FAMILY MEDICINE

## 2024-05-31 ENCOUNTER — OFFICE VISIT (OUTPATIENT)
Dept: FAMILY MEDICINE CLINIC | Facility: CLINIC | Age: 74
End: 2024-05-31

## 2024-05-31 VITALS
HEART RATE: 72 BPM | OXYGEN SATURATION: 98 % | SYSTOLIC BLOOD PRESSURE: 150 MMHG | WEIGHT: 131 LBS | HEIGHT: 66 IN | TEMPERATURE: 98 F | DIASTOLIC BLOOD PRESSURE: 76 MMHG | BODY MASS INDEX: 21.05 KG/M2

## 2024-05-31 DIAGNOSIS — R03.0 ELEVATED BP WITHOUT DIAGNOSIS OF HYPERTENSION: Primary | ICD-10-CM

## 2024-05-31 PROCEDURE — 3008F BODY MASS INDEX DOCD: CPT | Performed by: FAMILY MEDICINE

## 2024-05-31 PROCEDURE — 1126F AMNT PAIN NOTED NONE PRSNT: CPT | Performed by: FAMILY MEDICINE

## 2024-05-31 PROCEDURE — 3078F DIAST BP <80 MM HG: CPT | Performed by: FAMILY MEDICINE

## 2024-05-31 PROCEDURE — 99214 OFFICE O/P EST MOD 30 MIN: CPT | Performed by: FAMILY MEDICINE

## 2024-05-31 PROCEDURE — 1159F MED LIST DOCD IN RCRD: CPT | Performed by: FAMILY MEDICINE

## 2024-05-31 PROCEDURE — 3077F SYST BP >= 140 MM HG: CPT | Performed by: FAMILY MEDICINE

## 2024-05-31 RX ORDER — MECLIZINE HCL 12.5 MG/1
12.5 TABLET ORAL 3 TIMES DAILY PRN
COMMUNITY

## 2024-05-31 RX ORDER — PROPRANOLOL HYDROCHLORIDE 20 MG/1
20 TABLET ORAL 3 TIMES DAILY
Qty: 30 TABLET | Refills: 0 | Status: SHIPPED | OUTPATIENT
Start: 2024-05-31

## 2024-05-31 NOTE — PROGRESS NOTES
Subjective:   Beverly Angeles is a 73 year old female who presents for Blood Pressure (Patient gets hot )     Flushing sensation.  No dizziness, no nausea, no CP or SOB    History/Other:    Chief Complaint Reviewed and Verified  Nursing Notes Reviewed and   Verified  Tobacco Reviewed  Allergies Reviewed  Medications Reviewed    Problem List Reviewed  Medical History Reviewed  Surgical History   Reviewed  Family History Reviewed  Social History Reviewed         Tobacco:  She has never smoked tobacco.    Current Outpatient Medications   Medication Sig Dispense Refill    meclizine 12.5 MG Oral Tab Take 1 tablet (12.5 mg total) by mouth 3 (three) times daily as needed (as needed).      propranolol 20 MG Oral Tab Take 1 tablet (20 mg total) by mouth 3 (three) times daily. As needed 30 tablet 0    alendronate 70 MG Oral Tab Take 1 tablet (70 mg total) by mouth once a week. 12 tablet 3    atorvastatin 10 MG Oral Tab Take 1 tablet (10 mg total) by mouth daily. 90 tablet 3    Cholecalciferol (VITAMIN D3) 2000 units Oral Tab Take 1 tablet (2,000 Units total) by mouth.      Multiple Vitamin Oral Tab Take 1 tablet by mouth.           Review of Systems:  Review of Systems   Constitutional: Negative.    Respiratory: Negative.     Cardiovascular: Negative.    Gastrointestinal: Negative.    Neurological:  Positive for light-headedness. Negative for dizziness and weakness.   Psychiatric/Behavioral: Negative.           Objective:   /76   Pulse 72   Temp 98.3 °F (36.8 °C) (Temporal)   Ht 5' 6\" (1.676 m)   Wt 131 lb (59.4 kg)   SpO2 98%   BMI 21.14 kg/m²  Estimated body mass index is 21.14 kg/m² as calculated from the following:    Height as of this encounter: 5' 6\" (1.676 m).    Weight as of this encounter: 131 lb (59.4 kg).  Physical Exam  Vitals reviewed.   Constitutional:       Appearance: Normal appearance.   Cardiovascular:      Rate and Rhythm: Normal rate and regular rhythm.      Heart sounds: Normal heart  sounds.   Pulmonary:      Breath sounds: Normal breath sounds.   Neurological:      General: No focal deficit present.      Mental Status: She is alert.           Assessment & Plan:   1. Elevated BP without diagnosis of hypertension (Primary)  Other orders  -     Propranolol HCl; Take 1 tablet (20 mg total) by mouth 3 (three) times daily. As needed  Dispense: 30 tablet; Refill: 0  Repeat BP was good in 120s over 80s.  May be anxiety driven. Recommend as needed propranolol trial.  Discussed use and side effect.  Follow up on [facility]      No follow-ups on file.    Jayden Batista DO, 5/31/2024, 10:10 AM

## 2024-07-25 ENCOUNTER — OFFICE VISIT (OUTPATIENT)
Dept: FAMILY MEDICINE CLINIC | Facility: CLINIC | Age: 74
End: 2024-07-25

## 2024-07-25 VITALS
BODY MASS INDEX: 20.41 KG/M2 | DIASTOLIC BLOOD PRESSURE: 72 MMHG | OXYGEN SATURATION: 97 % | HEART RATE: 66 BPM | HEIGHT: 66 IN | SYSTOLIC BLOOD PRESSURE: 140 MMHG | WEIGHT: 127 LBS

## 2024-07-25 DIAGNOSIS — R03.0 ELEVATED BP WITHOUT DIAGNOSIS OF HYPERTENSION: Primary | ICD-10-CM

## 2024-07-25 PROCEDURE — 1126F AMNT PAIN NOTED NONE PRSNT: CPT | Performed by: FAMILY MEDICINE

## 2024-07-25 PROCEDURE — 3078F DIAST BP <80 MM HG: CPT | Performed by: FAMILY MEDICINE

## 2024-07-25 PROCEDURE — 3008F BODY MASS INDEX DOCD: CPT | Performed by: FAMILY MEDICINE

## 2024-07-25 PROCEDURE — 3077F SYST BP >= 140 MM HG: CPT | Performed by: FAMILY MEDICINE

## 2024-07-25 PROCEDURE — 99214 OFFICE O/P EST MOD 30 MIN: CPT | Performed by: FAMILY MEDICINE

## 2024-07-25 PROCEDURE — 1159F MED LIST DOCD IN RCRD: CPT | Performed by: FAMILY MEDICINE

## 2024-07-25 RX ORDER — PROPRANOLOL HYDROCHLORIDE 20 MG/1
20 TABLET ORAL 3 TIMES DAILY
Qty: 30 TABLET | Refills: 3 | Status: SHIPPED | OUTPATIENT
Start: 2024-07-25 | End: 2024-07-25

## 2024-07-25 RX ORDER — PROPRANOLOL HYDROCHLORIDE 20 MG/1
20 TABLET ORAL 3 TIMES DAILY
Qty: 90 TABLET | Refills: 1 | Status: SHIPPED | OUTPATIENT
Start: 2024-07-25

## 2024-07-25 NOTE — PROGRESS NOTES
Subjective:   Beverly Angeles is a 73 year old female who presents for Follow - Up (Blood pressure follow up )       History/Other:    Chief Complaint Reviewed and Verified  Nursing Notes Reviewed and   Verified  Tobacco Reviewed  Allergies Reviewed  Medications Reviewed    Problem List Reviewed  Medical History Reviewed  Surgical History   Reviewed  Family History Reviewed  Social History Reviewed         Tobacco:  She has never smoked tobacco.    Current Outpatient Medications   Medication Sig Dispense Refill    propranolol 20 MG Oral Tab Take 1 tablet (20 mg total) by mouth 3 (three) times daily. As needed 90 tablet 1    meclizine 12.5 MG Oral Tab Take 1 tablet (12.5 mg total) by mouth 3 (three) times daily as needed (as needed).      alendronate 70 MG Oral Tab Take 1 tablet (70 mg total) by mouth once a week. 12 tablet 3    atorvastatin 10 MG Oral Tab Take 1 tablet (10 mg total) by mouth daily. 90 tablet 3    Cholecalciferol (VITAMIN D3) 2000 units Oral Tab Take 1 tablet (2,000 Units total) by mouth.      Multiple Vitamin Oral Tab Take 1 tablet by mouth.           Review of Systems:  Review of Systems   Constitutional: Negative.    Respiratory: Negative.     Cardiovascular: Negative.    Neurological: Negative.          Objective:   /72   Pulse 66   Ht 5' 6\" (1.676 m)   Wt 127 lb (57.6 kg)   SpO2 97%   BMI 20.50 kg/m²  Estimated body mass index is 20.5 kg/m² as calculated from the following:    Height as of this encounter: 5' 6\" (1.676 m).    Weight as of this encounter: 127 lb (57.6 kg).  Physical Exam  Vitals reviewed.   Constitutional:       Appearance: Normal appearance.   Cardiovascular:      Rate and Rhythm: Normal rate and regular rhythm.      Heart sounds: Normal heart sounds.   Pulmonary:      Breath sounds: Normal breath sounds.   Musculoskeletal:      Right lower leg: No edema.      Left lower leg: No edema.   Neurological:      Mental Status: She is alert.   Psychiatric:         Mood  and Affect: Mood normal.           Assessment & Plan:   There are no diagnoses linked to this encounter.    ICD-10-CM    1. Elevated BP without diagnosis of hypertension  R03.0        Intermittent use of medication.  No side effects . Takes when she feels heart rate elevation or headache.  Has BP monitor at home.  May take med when and if elevated - she does not wish to use daily.   Follow up in January.      No follow-ups on file.    Jayden Batista DO, 7/25/2024, 9:05 AM

## 2024-08-26 NOTE — TELEPHONE ENCOUNTER
propranolol 20 MG Oral Tab, Take 1 tablet (20 mg total) by mouth 3 (three) times daily. As needed, Disp: 90 tablet, Rfl: 1

## 2024-08-27 RX ORDER — PROPRANOLOL HYDROCHLORIDE 20 MG/1
20 TABLET ORAL 3 TIMES DAILY
Qty: 90 TABLET | Refills: 1 | OUTPATIENT
Start: 2024-08-27

## 2024-10-22 NOTE — TELEPHONE ENCOUNTER
90 Day prescription request - Qty: 270    Current Outpatient Medications   Medication Sig Dispense Refill    propranolol 20 MG Oral Tab Take 1 tablet (20 mg total) by mouth 3 (three) times daily. As needed 90 tablet 1

## 2024-10-24 RX ORDER — PROPRANOLOL HCL 20 MG
20 TABLET ORAL 3 TIMES DAILY
Qty: 270 TABLET | Refills: 3 | Status: SHIPPED | OUTPATIENT
Start: 2024-10-24

## 2024-10-24 NOTE — TELEPHONE ENCOUNTER
Please Review. Protocol Failed; No Protocol   BP Readings from Last 1 Encounters:   07/25/24 140/72     Requested Prescriptions   Pending Prescriptions Disp Refills    propranolol 20 MG Oral Tab 270 tablet 0     Sig: Take 1 tablet (20 mg total) by mouth 3 (three) times daily. As needed       Hypertension Medications Protocol Failed - 10/24/2024 11:11 AM        Failed - Last BP reading less than 140/90     BP Readings from Last 1 Encounters:   07/25/24 140/72               Passed - CMP or BMP in past 12 months        Passed - In person appointment or virtual visit in the past 12 mos or appointment in next 3 mos     Recent Outpatient Visits              3 months ago Elevated BP without diagnosis of hypertension    [facility] Jayden Batista DO    Office Visit    4 months ago [facility] diagnosis of [facility]    9 months ago Hypercholesterolemia    [facility] Jayden Batista DO    Office Visit    1 year ago Age-related nuclear cataract of both eyes    [facility] Diogenes Lyons MD    Office Visit    1 year ago Labile blood pressure    [facility]    [facility] Visit          Future Appointments         Provider Department Appt Notes    In 3 months Jayden Batista DO [facility] Annual 01/09/24 last ar                    Passed - EGFRCR or GFRNAA > 50     GFR Evaluation  EGFRCR: 78 , resulted on 1/11/2024                 Future Appointments         Provider Department Appt Notes    In 3 months Jayden Batista DO [facility] Annual 01/09/24 last ar          Recent Outpatient Visits              3 months ago Elevated BP  without diagnosis of hypertension    [facility] [address], Jayden Castillo,     Office Visit    4 months ago Elevated BP without diagnosis of hypertension    [facility], Jayden Castillo,     Office Visit    9 months ago Hypercholesterolemia    [facility], Jayden Castillo,     Office Visit    1 year ago Age-related nuclear cataract of both eyes    [facility], Diogenes Ivan MD    Office Visit    1 year ago Labile blood pressure    [facility], Jayden Castillo, DO    Office Visit

## 2025-01-23 ENCOUNTER — LAB ENCOUNTER (OUTPATIENT)
Dept: LAB | Age: 75
End: 2025-01-23
Attending: FAMILY MEDICINE
Payer: MEDICARE

## 2025-01-23 ENCOUNTER — OFFICE VISIT (OUTPATIENT)
Dept: FAMILY MEDICINE CLINIC | Facility: CLINIC | Age: 75
End: 2025-01-23

## 2025-01-23 VITALS
SYSTOLIC BLOOD PRESSURE: 130 MMHG | BODY MASS INDEX: 20.41 KG/M2 | HEIGHT: 66 IN | OXYGEN SATURATION: 96 % | WEIGHT: 127 LBS | DIASTOLIC BLOOD PRESSURE: 85 MMHG | HEART RATE: 74 BPM

## 2025-01-23 DIAGNOSIS — Z12.31 ENCOUNTER FOR SCREENING MAMMOGRAM FOR MALIGNANT NEOPLASM OF BREAST: ICD-10-CM

## 2025-01-23 DIAGNOSIS — E78.00 HYPERCHOLESTEROLEMIA: Primary | ICD-10-CM

## 2025-01-23 DIAGNOSIS — R03.0 ELEVATED BP WITHOUT DIAGNOSIS OF HYPERTENSION: ICD-10-CM

## 2025-01-23 DIAGNOSIS — H25.13 AGE-RELATED NUCLEAR CATARACT OF BOTH EYES: ICD-10-CM

## 2025-01-23 DIAGNOSIS — E78.00 HYPERCHOLESTEROLEMIA: ICD-10-CM

## 2025-01-23 DIAGNOSIS — M81.0 AGE-RELATED OSTEOPOROSIS WITHOUT CURRENT PATHOLOGICAL FRACTURE: ICD-10-CM

## 2025-01-23 LAB
ALBUMIN SERPL-MCNC: 4.7 G/DL (ref 3.2–4.8)
ALBUMIN/GLOB SERPL: 1.8 {RATIO} (ref 1–2)
ALP LIVER SERPL-CCNC: 56 U/L
ALT SERPL-CCNC: 13 U/L
ANION GAP SERPL CALC-SCNC: 8 MMOL/L (ref 0–18)
AST SERPL-CCNC: 17 U/L (ref ?–34)
BASOPHILS # BLD AUTO: 0.03 X10(3) UL (ref 0–0.2)
BASOPHILS NFR BLD AUTO: 0.6 %
BILIRUB SERPL-MCNC: 0.5 MG/DL (ref 0.2–1.1)
BILIRUB UR QL: NEGATIVE
BUN BLD-MCNC: 16 MG/DL (ref 9–23)
BUN/CREAT SERPL: 20.3 (ref 10–20)
CALCIUM BLD-MCNC: 10.1 MG/DL (ref 8.7–10.4)
CHLORIDE SERPL-SCNC: 102 MMOL/L (ref 98–112)
CHOLEST SERPL-MCNC: 180 MG/DL (ref ?–200)
CLARITY UR: CLEAR
CO2 SERPL-SCNC: 31 MMOL/L (ref 21–32)
COLOR UR: COLORLESS
CREAT BLD-MCNC: 0.79 MG/DL
DEPRECATED RDW RBC AUTO: 42.8 FL (ref 35.1–46.3)
EGFRCR SERPLBLD CKD-EPI 2021: 78 ML/MIN/1.73M2 (ref 60–?)
EOSINOPHIL # BLD AUTO: 0.04 X10(3) UL (ref 0–0.7)
EOSINOPHIL NFR BLD AUTO: 0.8 %
ERYTHROCYTE [DISTWIDTH] IN BLOOD BY AUTOMATED COUNT: 12.2 % (ref 11–15)
FASTING PATIENT LIPID ANSWER: YES
FASTING STATUS PATIENT QL REPORTED: YES
GLOBULIN PLAS-MCNC: 2.6 G/DL (ref 2–3.5)
GLUCOSE BLD-MCNC: 100 MG/DL (ref 70–99)
GLUCOSE UR-MCNC: NORMAL MG/DL
HCT VFR BLD AUTO: 39.3 %
HDLC SERPL-MCNC: 63 MG/DL (ref 40–59)
HGB BLD-MCNC: 13.4 G/DL
IMM GRANULOCYTES # BLD AUTO: 0 X10(3) UL (ref 0–1)
IMM GRANULOCYTES NFR BLD: 0 %
KETONES UR-MCNC: NEGATIVE MG/DL
LDLC SERPL CALC-MCNC: 100 MG/DL (ref ?–100)
LEUKOCYTE ESTERASE UR QL STRIP.AUTO: NEGATIVE
LYMPHOCYTES # BLD AUTO: 1.47 X10(3) UL (ref 1–4)
LYMPHOCYTES NFR BLD AUTO: 27.8 %
MCH RBC QN AUTO: 32.6 PG (ref 26–34)
MCHC RBC AUTO-ENTMCNC: 34.1 G/DL (ref 31–37)
MCV RBC AUTO: 95.6 FL
MONOCYTES # BLD AUTO: 0.33 X10(3) UL (ref 0.1–1)
MONOCYTES NFR BLD AUTO: 6.2 %
NEUTROPHILS # BLD AUTO: 3.42 X10 (3) UL (ref 1.5–7.7)
NEUTROPHILS # BLD AUTO: 3.42 X10(3) UL (ref 1.5–7.7)
NEUTROPHILS NFR BLD AUTO: 64.6 %
NITRITE UR QL STRIP.AUTO: NEGATIVE
NONHDLC SERPL-MCNC: 117 MG/DL (ref ?–130)
OSMOLALITY SERPL CALC.SUM OF ELEC: 293 MOSM/KG (ref 275–295)
PH UR: 6.5 [PH] (ref 5–8)
PLATELET # BLD AUTO: 232 10(3)UL (ref 150–450)
POTASSIUM SERPL-SCNC: 4.2 MMOL/L (ref 3.5–5.1)
PROT SERPL-MCNC: 7.3 G/DL (ref 5.7–8.2)
PROT UR-MCNC: NEGATIVE MG/DL
RBC # BLD AUTO: 4.11 X10(6)UL
SODIUM SERPL-SCNC: 141 MMOL/L (ref 136–145)
SP GR UR STRIP: 1.01 (ref 1–1.03)
TRIGL SERPL-MCNC: 93 MG/DL (ref 30–149)
UROBILINOGEN UR STRIP-ACNC: NORMAL
VLDLC SERPL CALC-MCNC: 15 MG/DL (ref 0–30)
WBC # BLD AUTO: 5.3 X10(3) UL (ref 4–11)

## 2025-01-23 PROCEDURE — 81001 URINALYSIS AUTO W/SCOPE: CPT

## 2025-01-23 PROCEDURE — 36415 COLL VENOUS BLD VENIPUNCTURE: CPT

## 2025-01-23 PROCEDURE — 80053 COMPREHEN METABOLIC PANEL: CPT

## 2025-01-23 PROCEDURE — 3079F DIAST BP 80-89 MM HG: CPT | Performed by: FAMILY MEDICINE

## 2025-01-23 PROCEDURE — G0439 PPPS, SUBSEQ VISIT: HCPCS | Performed by: FAMILY MEDICINE

## 2025-01-23 PROCEDURE — 85025 COMPLETE CBC W/AUTO DIFF WBC: CPT

## 2025-01-23 PROCEDURE — 1126F AMNT PAIN NOTED NONE PRSNT: CPT | Performed by: FAMILY MEDICINE

## 2025-01-23 PROCEDURE — 1159F MED LIST DOCD IN RCRD: CPT | Performed by: FAMILY MEDICINE

## 2025-01-23 PROCEDURE — 80061 LIPID PANEL: CPT

## 2025-01-23 PROCEDURE — G0009 ADMIN PNEUMOCOCCAL VACCINE: HCPCS | Performed by: FAMILY MEDICINE

## 2025-01-23 PROCEDURE — 1170F FXNL STATUS ASSESSED: CPT | Performed by: FAMILY MEDICINE

## 2025-01-23 PROCEDURE — 96160 PT-FOCUSED HLTH RISK ASSMT: CPT | Performed by: FAMILY MEDICINE

## 2025-01-23 PROCEDURE — 3008F BODY MASS INDEX DOCD: CPT | Performed by: FAMILY MEDICINE

## 2025-01-23 PROCEDURE — 90677 PCV20 VACCINE IM: CPT | Performed by: FAMILY MEDICINE

## 2025-01-23 PROCEDURE — 3075F SYST BP GE 130 - 139MM HG: CPT | Performed by: FAMILY MEDICINE

## 2025-01-23 RX ORDER — ATORVASTATIN CALCIUM 10 MG/1
10 TABLET, FILM COATED ORAL DAILY
Qty: 90 TABLET | Refills: 3 | Status: SHIPPED | OUTPATIENT
Start: 2025-01-23

## 2025-01-23 NOTE — PROGRESS NOTES
Subjective:   Beverly Angeles is a 74 year old female who presents for a MA AHA (Medicare Advantage Annual Health Assessment) and Initial Annual Wellness Visit (outside the first 12 months of Medicare eligibility, no prior AWV) and scheduled follow up of multiple significant but stable problems.       History/Other:   Fall Risk Assessment:   She has been screened for Falls and is low risk.      Cognitive Assessment:   She had a completely normal cognitive assessment - see flowsheet entries     Functional Ability/Status:   Beverly Angeles has some abnormal functions as listed below:  She has Driving difficulties based on screening of functional status. She has Vision problems based on screening of functional status.       Depression Screening (PHQ):  PHQ-2 SCORE: 0  , done 1/23/2025             Advanced Directives:   She does have a Living Will but we do NOT have it on file in Epic.    She does NOT have a Power of  for Health Care. [Do you have a healthcare power of ?: No]  Discussed Advance Care Planning with patient (and family/surrogate if present). Standard forms made available to patient in After Visit Summary.      Patient Active Problem List   Diagnosis    Age-related nuclear cataract of both eyes    Hypercholesterolemia    Age-related osteoporosis without current pathological fracture    Elevated BP without diagnosis of hypertension     Allergies:  She has No Known Allergies.    Current Medications:  Outpatient Medications Marked as Taking for the 1/23/25 encounter (Office Visit) with Jayden Batista, DO   Medication Sig    atorvastatin 10 MG Oral Tab Take 1 tablet (10 mg total) by mouth daily.    propranolol 20 MG Oral Tab Take 1 tablet (20 mg total) by mouth 3 (three) times daily. As needed    Cholecalciferol (VITAMIN D3) 2000 units Oral Tab Take 1 tablet (2,000 Units total) by mouth.    Multiple Vitamin Oral Tab Take 1 tablet by mouth.       Medical History:  She  has a past medical history of  Age-related nuclear cataract of both eyes (8/6/2014), Cataracts, bilateral (1999), Floater, vitreous (11/11/2015), Itchy eyes (11/11/2015), MGD (meibomian gland disease) (2006), and Pinguecula (2004).  Surgical History:  She  has no past surgical history on file.   Family History:  Her family history includes Dementia in her mother.  Social History:  She  reports that she has never smoked. She has never been exposed to tobacco smoke. She has never used smokeless tobacco. She reports that she does not drink alcohol and does not use drugs.    Tobacco:  She has never smoked tobacco.    CAGE Alcohol Screen:   CAGE screening score of 0 on 1/23/2025, showing low risk of alcohol abuse.      Patient Care Team:  Per Schofield MD as PCP - General (Internal Medicine)    Review of Systems   Constitutional: Negative.    HENT: Negative.     Eyes: Negative.    Respiratory: Negative.     Cardiovascular: Negative.    Gastrointestinal: Negative.    Genitourinary: Negative.    Musculoskeletal: Negative.    Skin: Negative.    Neurological: Negative.    Psychiatric/Behavioral: Negative.            Objective:   Physical Exam  Vitals reviewed.   Constitutional:       Appearance: Normal appearance. She is well-developed.   HENT:      Head: Normocephalic.      Right Ear: Hearing, tympanic membrane and ear canal normal.      Left Ear: Hearing, tympanic membrane and ear canal normal.      Nose: Nose normal.   Eyes:      Conjunctiva/sclera: Conjunctivae normal.      Pupils: Pupils are equal, round, and reactive to light.      Funduscopic exam:     Right eye: No hemorrhage or AV nicking.         Left eye: No hemorrhage or AV nicking.   Neck:      Thyroid: No thyroid mass or thyromegaly.   Cardiovascular:      Heart sounds: Normal heart sounds.   Pulmonary:      Breath sounds: Normal breath sounds.   Abdominal:      Tenderness: There is no abdominal tenderness.   Musculoskeletal:      Cervical back: Normal range of motion and neck supple.       Lumbar back: Normal.   Lymphadenopathy:      Upper Body:      Right upper body: No supraclavicular adenopathy.      Left upper body: No supraclavicular adenopathy.   Skin:     Comments: No suspicious findings waist up exam   Neurological:      Mental Status: She is alert and oriented to person, place, and time.      Sensory: No sensory deficit.      Deep Tendon Reflexes: Reflexes are normal and symmetric.   Psychiatric:         Mood and Affect: Mood is not anxious or depressed.            /85   Pulse 74   Ht 5' 6\" (1.676 m)   Wt 127 lb (57.6 kg)   SpO2 96%   BMI 20.50 kg/m²  Estimated body mass index is 20.5 kg/m² as calculated from the following:    Height as of this encounter: 5' 6\" (1.676 m).    Weight as of this encounter: 127 lb (57.6 kg).    Medicare Hearing Assessment:   Hearing Screening    Screening Method: Questionnaire  I have a problem hearing over the telephone: No I have trouble following the conversations when two or more people are talking at the same time: No   I have trouble understanding things on the TV: No I have to strain to understand conversations: No   I have to worry about missing the telephone ring or doorbell: No I have trouble hearing conversations in a noisy background such as a crowded room or restaurant: No   I get confused about where sounds come from: No I misunderstand some words in a sentence and need to ask people to repeat themselves: No   I especially have trouble understanding the speech of women and children: No I have trouble understanding the speaker in a large room such as at a meeting or place of Faith: No   Many people I talk to seem to mumble (or don't speak clearly): No People get annoyed because I misunderstand what they say: No   I misunderstand what others are saying and make inappropriate responses: No I avoid social activities because I cannot hear well and fear I will reply improperly: No   Family members and friends have told me they think I may  have hearing loss: No                   Assessment & Plan:   Beverly Angeles is a 74 year old female who presents for a Medicare Assessment.     1. Hypercholesterolemia (Primary)  -     CBC With Differential With Platelet; Future; Expected date: 01/23/2025  -     Comp Metabolic Panel (14); Future; Expected date: 01/23/2025  -     Lipid Panel; Future; Expected date: 01/23/2025  -     Urinalysis, Routine; Future; Expected date: 01/23/2025  Labs ordered    2. Elevated BP without diagnosis of hypertension  -     Urinalysis, Routine; Future; Expected date: 01/23/2025  Has prn med.  Will use if high and symptomatic usually due to anxiety    3. Encounter for screening mammogram for malignant neoplasm of breast  -     [facility] 2D+3D SCREENING BILAT (CPT=77067/99397); Future; Expected date: 03/01/2025    4. Age-related osteoporosis without current pathological fracture  Last DEXA showed improvement.  Has been on med thirteen years.  Will stop medication.     5. Age-related nuclear cataract of both eyes  Other orders  -     Atorvastatin Calcium; Take 1 tablet (10 mg total) by mouth daily.  Dispense: 90 tablet; Refill: 3  -     Prevnar 20 (PCV20) [63193]  Sees eye doctor.    The patient indicates understanding of these issues and agrees to the plan.  Lab work ordered.  Reinforced healthy diet, lifestyle, and exercise.      No follow-ups on file.     Jayden Batista DO, 1/23/2025     Supplementary Documentation:   General Health:  In the past six months, have you lost more than 10 pounds without trying?: 2 - No  Has your appetite been poor?: No  Type of Diet: Balanced  How does the patient maintain a good energy level?: Daily Walks  How would you describe your daily physical activity?: Moderate  How would you describe your current health state?: Good  How do you maintain positive mental well-being?: Social Interaction  On a scale of 0 to 10, with 0 being no pain and 10 being severe pain, what is your pain level?: 0 - (None)  In  the past six months, have you experienced urine leakage?: 0-No  At any time do you feel concerned for the safety/well-being of yourself and/or your children, in your home or elsewhere?: No  Have you had any immunizations at another office such as Influenza, Hepatitis B, Tetanus, or Pneumococcal?: No    Health Maintenance   Topic Date Due    Zoster Vaccines (1 of 2) Never done    COVID-19 Vaccine (1 - 2024-25 season) Never done    Influenza Vaccine (1) 10/01/2024    Annual Well Visit  01/01/2025    Colorectal Cancer Screening  01/01/2027    DEXA Scan  Completed    Annual Depression Screening  Completed    Fall Risk Screening (Annual)  Completed    Pneumococcal Vaccine: 50+ Years  Completed    Meningococcal B Vaccine  Aged Out    Mammogram  Discontinued

## 2025-02-03 DIAGNOSIS — R31.29 MICROHEMATURIA: Primary | ICD-10-CM

## 2025-02-26 RX ORDER — ALENDRONATE SODIUM 70 MG/1
70 TABLET ORAL WEEKLY
Qty: 12 TABLET | Refills: 3 | OUTPATIENT
Start: 2025-02-26

## 2025-02-26 NOTE — TELEPHONE ENCOUNTER
Per office visit with Dr. Batista 1/23/25:  Age-related osteoporosis without current pathological fracture  Last DEXA showed improvement.  Has been on med thirteen years.  Will stop medication.     Auto refill request from pharmacy. 90 day supply dispensed 11/23/24.     Discontinued.

## 2025-03-19 ENCOUNTER — HOSPITAL ENCOUNTER (OUTPATIENT)
Dept: MAMMOGRAPHY | Facility: HOSPITAL | Age: 75
Discharge: HOME | End: 2025-03-19
Attending: FAMILY MEDICINE
Payer: MEDICARE

## 2025-03-19 DIAGNOSIS — Z12.31 ENCOUNTER FOR SCREENING MAMMOGRAM FOR MALIGNANT NEOPLASM OF BREAST: ICD-10-CM

## 2025-03-19 PROCEDURE — 77063 BREAST TOMOSYNTHESIS BI: CPT | Performed by: FAMILY MEDICINE

## 2025-03-19 PROCEDURE — 77067 SCR MAMMO BI INCL CAD: CPT | Performed by: FAMILY MEDICINE

## (undated) NOTE — MR AVS SNAPSHOT
[facility]  [address]  [phone]               Thank you for choosing us for your health care visit with Bridgette Stevens MD.  We are glad to serve you and happy to provide you with this summa EAT THESE FOODS MORE OFTEN: EAT THESE FOODS LESS OFTEN:   Make half your plate fruits and vegetables Highly refined, white starches including white bread, rice and pasta   Eat plenty of protein, keep the fat content low Sugars:  sodas and sports drinks, ca